# Patient Record
Sex: FEMALE | URBAN - METROPOLITAN AREA
[De-identification: names, ages, dates, MRNs, and addresses within clinical notes are randomized per-mention and may not be internally consistent; named-entity substitution may affect disease eponyms.]

---

## 2023-02-21 ENCOUNTER — HOSPITAL ENCOUNTER (OUTPATIENT)
Age: 28
Setting detail: SPECIMEN
Discharge: HOME | End: 2023-02-21

## 2023-02-21 ENCOUNTER — OFFICE VISIT (OUTPATIENT)
Dept: OBGYN CLINIC | Age: 28
End: 2023-02-21
Payer: COMMERCIAL

## 2023-02-21 VITALS
SYSTOLIC BLOOD PRESSURE: 124 MMHG | DIASTOLIC BLOOD PRESSURE: 72 MMHG | HEIGHT: 70 IN | BODY MASS INDEX: 19.53 KG/M2 | WEIGHT: 136.4 LBS

## 2023-02-21 DIAGNOSIS — N92.6 MISSED MENSES: ICD-10-CM

## 2023-02-21 DIAGNOSIS — Z32.01 POSITIVE PREGNANCY TEST: Primary | ICD-10-CM

## 2023-02-21 DIAGNOSIS — Z32.01 POSITIVE PREGNANCY TEST: ICD-10-CM

## 2023-02-21 LAB
BILIRUBIN URINE: NEGATIVE
COLOR: YELLOW
GLUCOSE UR STRIP.AUTO-MCNC: NEGATIVE MG/DL
KETONES UR STRIP.AUTO-MCNC: NEGATIVE MG/DL
LEUKOCYTE ESTERASE UR QL STRIP.AUTO: ABNORMAL
NITRITE UR QL STRIP.AUTO: NEGATIVE
PROT UR STRIP.AUTO-MCNC: 6 MG/DL (ref 5–8)
PROT UR STRIP.AUTO-MCNC: NEGATIVE MG/DL
SPECIFIC GRAVITY UA: 1.03 (ref 1–1.03)
TURBIDITY: CLEAR
URINE HGB: NEGATIVE
UROBILINOGEN, URINE: NORMAL

## 2023-02-21 PROCEDURE — 99214 OFFICE O/P EST MOD 30 MIN: CPT

## 2023-02-21 ASSESSMENT — ENCOUNTER SYMPTOMS
CONSTIPATION: 0
NAUSEA: 0
VOMITING: 0
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
## 2023-02-22 LAB
AMPHETAMINE SCREEN URINE: NEGATIVE
BACTERIA: ABNORMAL
BARBITURATE SCREEN URINE: NEGATIVE
BENZODIAZEPINE SCREEN, URINE: NEGATIVE
C TRACH DNA SPEC QL PROBE+SIG AMP: NEGATIVE
CANDIDA SPECIES, DNA PROBE: NEGATIVE
CANNABINOID SCREEN URINE: NEGATIVE
COCAINE METABOLITE, URINE: NEGATIVE
EPITHELIAL CELLS UA: ABNORMAL /HPF (ref 0–5)
FENTANYL URINE: NEGATIVE
GARDNERELLA VAGINALIS, DNA PROBE: POSITIVE
METHADONE SCREEN, URINE: NEGATIVE
MICROORGANISM SPEC CULT: NORMAL
MUCUS: ABNORMAL
N GONORRHOEA DNA SPEC QL PROBE+SIG AMP: NEGATIVE
OPIATES, URINE: NEGATIVE
OXYCODONE SCREEN URINE: NEGATIVE
PHENCYCLIDINE, URINE: NEGATIVE
RBC CLUMPS #/AREA URNS AUTO: ABNORMAL /HPF (ref 0–2)
SOURCE: ABNORMAL
SPECIMEN DESCRIPTION: NORMAL
SPECIMEN DESCRIPTION: NORMAL
TEST INFORMATION: NORMAL
TRICHOMONAS VAGINALIS DNA: NEGATIVE
WBC UA: ABNORMAL /HPF (ref 0–5)

## 2023-02-23 RX ORDER — METRONIDAZOLE 7.5 MG/G
1 GEL VAGINAL NIGHTLY
Qty: 1 EACH | Refills: 0 | Status: SHIPPED | OUTPATIENT
Start: 2023-02-23 | End: 2023-03-02

## 2023-03-03 ENCOUNTER — HOSPITAL ENCOUNTER (OUTPATIENT)
Age: 28
Setting detail: SPECIMEN
Discharge: HOME | End: 2023-03-03

## 2023-03-03 DIAGNOSIS — Z32.01 POSITIVE PREGNANCY TEST: ICD-10-CM

## 2023-03-03 DIAGNOSIS — N92.6 MISSED MENSES: ICD-10-CM

## 2023-03-03 LAB
ABSOLUTE EOS #: 0.24 K/UL (ref 0–0.44)
ABSOLUTE IMMATURE GRANULOCYTE: <0.03 K/UL (ref 0–0.3)
ABSOLUTE LYMPH #: 1.72 K/UL (ref 1.1–3.7)
ABSOLUTE MONO #: 0.43 K/UL (ref 0.1–1.2)
BASOPHILS # BLD: 1 % (ref 0–2)
BASOPHILS ABSOLUTE: 0.04 K/UL (ref 0–0.2)
EOSINOPHILS RELATIVE PERCENT: 3 % (ref 1–4)
HCT VFR BLD AUTO: 40 % (ref 36.3–47.1)
HGB BLD-MCNC: 13.5 G/DL (ref 11.9–15.1)
IMMATURE GRANULOCYTES: 0 %
LYMPHOCYTES # BLD: 22 % (ref 24–43)
MCH RBC QN AUTO: 29.3 PG (ref 25.2–33.5)
MCHC RBC AUTO-ENTMCNC: 33.8 G/DL (ref 28.4–34.8)
MCV RBC AUTO: 87 FL (ref 82.6–102.9)
MONOCYTES # BLD: 5 % (ref 3–12)
NRBC AUTOMATED: 0 PER 100 WBC
PDW BLD-RTO: 12.7 % (ref 11.8–14.4)
PLATELET # BLD AUTO: 206 K/UL (ref 138–453)
PMV BLD AUTO: 9.7 FL (ref 8.1–13.5)
RBC # BLD: 4.6 M/UL (ref 3.95–5.11)
SEG NEUTROPHILS: 69 % (ref 36–65)
SEGMENTED NEUTROPHILS ABSOLUTE COUNT: 5.54 K/UL (ref 1.5–8.1)
WBC # BLD AUTO: 8 K/UL (ref 3.5–11.3)

## 2023-03-04 LAB
ABO/RH: NORMAL
ANTIBODY SCREEN: NEGATIVE
HBV SURFACE AG SER QL: NONREACTIVE
HCV AB SER QL: NONREACTIVE
HISTORY CHECK: NORMAL
HIV 1+2 AB+HIV1 P24 AG SERPL QL IA: NONREACTIVE
RUBV IGG SER QL: 36.3 IU/ML
T PALLIDUM AB SER QL IA: NONREACTIVE

## 2023-03-06 ENCOUNTER — INITIAL PRENATAL (OUTPATIENT)
Dept: OBGYN CLINIC | Age: 28
End: 2023-03-06

## 2023-03-06 VITALS
WEIGHT: 136.6 LBS | BODY MASS INDEX: 20.23 KG/M2 | HEIGHT: 69 IN | DIASTOLIC BLOOD PRESSURE: 66 MMHG | SYSTOLIC BLOOD PRESSURE: 102 MMHG

## 2023-03-06 DIAGNOSIS — Z34.01 FIRST PREGNANCY, FIRST TRIMESTER: ICD-10-CM

## 2023-03-06 DIAGNOSIS — Z3A.11 11 WEEKS GESTATION OF PREGNANCY: Primary | ICD-10-CM

## 2023-03-06 DIAGNOSIS — Z83.3 FH: TYPE 1 DIABETES: ICD-10-CM

## 2023-03-06 DIAGNOSIS — G43.011 INTRACTABLE MIGRAINE WITHOUT AURA AND WITH STATUS MIGRAINOSUS: ICD-10-CM

## 2023-03-06 PROBLEM — O26.899 RH NEGATIVE STATE IN ANTEPARTUM PERIOD: Status: ACTIVE | Noted: 2023-03-06

## 2023-03-06 PROBLEM — Z67.91 RH NEGATIVE STATE IN ANTEPARTUM PERIOD: Status: ACTIVE | Noted: 2023-03-06

## 2023-03-06 PROCEDURE — 0500F INITIAL PRENATAL CARE VISIT: CPT | Performed by: OBSTETRICS & GYNECOLOGY

## 2023-03-06 RX ORDER — CALCIUM CARBONATE/VITAMIN D3 500-10/5ML
400 LIQUID (ML) ORAL NIGHTLY
Qty: 30 CAPSULE | Refills: 12 | Status: SHIPPED | OUTPATIENT
Start: 2023-03-06 | End: 2023-04-05

## 2023-03-06 NOTE — PROGRESS NOTES
Relationship with FOB: , living together, first pregnancy together  Partner's name:Mika   Plans to Breast fdg  Pain Score:0/10  Job title:-teachers aid  This is a planned pregnancy:Yes, not preventing  Certain LMP:Yes  S/S of pregnancy:Yes, breast tenderness and missed cycle. Hx N/V pregnancy:Mild nausea       Mother's ethnicity:    Father's ethnicity:       -  There is no problem list on file for this patient. Blood pressure 102/66, height 5' 9\" (1.753 m), weight 136 lb 9.6 oz (62 kg), last menstrual period 2022. Shyam Santiago is a 32 y.o. , here for her ACOG. The patients past medical, surgical, social and family history were reviewed. Current medications and allergies were reviewed, and documented in the chart. Menstrual history: Regular  Birth control: BCP and depo. Depo was a bad experience mentally. Desires IUD after delivery. Wt Readings from Last 3 Encounters:   23 136 lb 9.6 oz (62 kg)   23 136 lb 6.4 oz (61.9 kg)     Recent Results (from the past 8736 hour(s))   Urinalysis    Collection Time: 23 11:11 PM   Result Value Ref Range    Color, UA Yellow Yellow    Turbidity UA Clear Clear    Glucose, Ur NEGATIVE NEGATIVE    Bilirubin Urine NEGATIVE NEGATIVE    Ketones, Urine NEGATIVE NEGATIVE    Specific Gravity, UA 1.026 1.005 - 1.030    Urine Hgb NEGATIVE NEGATIVE    pH, UA 6.0 5.0 - 8.0    Protein, UA NEGATIVE NEGATIVE    Urobilinogen, Urine Normal Normal    Nitrite, Urine NEGATIVE NEGATIVE    Leukocyte Esterase, Urine TRACE (A) NEGATIVE   Culture, Urine    Collection Time: 23 11:11 PM    Specimen: Urine, clean catch   Result Value Ref Range    Specimen Description . CLEAN CATCH URINE     Culture NO SIGNIFICANT GROWTH    Urine Drug Screen    Collection Time: 23 11:11 PM   Result Value Ref Range    Amphetamine Screen, Ur NEGATIVE NEGATIVE    Barbiturate Screen, Ur NEGATIVE NEGATIVE    Benzodiazepine Screen, Urine NEGATIVE NEGATIVE    Cocaine Metabolite, Urine NEGATIVE NEGATIVE    Methadone Screen, Urine NEGATIVE NEGATIVE    Opiates, Urine NEGATIVE NEGATIVE    Phencyclidine, Urine NEGATIVE NEGATIVE    Cannabinoid Scrn, Ur NEGATIVE NEGATIVE    Oxycodone Screen, Ur NEGATIVE NEGATIVE    Fentanyl, Ur NEGATIVE NEGATIVE    Test Information       Assay provides medical screening only. The absence of expected drug(s) and/or metabolite(s) may indicate diluted or adulterated urine, limitations of testing or timing of collection. Microscopic Urinalysis    Collection Time: 02/21/23 11:11 PM   Result Value Ref Range    WBC, UA 5 TO 10 0 - 5 /HPF    RBC, UA 2 TO 5 0 - 2 /HPF    Epithelial Cells UA 5 TO 10 0 - 5 /HPF    Bacteria, UA FEW (A) None    Mucus, UA 1+ (A) None   C.trachomatis N.gonorrhoeae DNA    Collection Time: 02/21/23 11:12 PM    Specimen: Cervix   Result Value Ref Range    Specimen Description . CERVIX     C. trachomatis DNA NEGATIVE NEGATIVE    N. gonorrhoeae DNA NEGATIVE NEGATIVE   Vaginitis DNA Probe    Collection Time: 02/21/23 11:12 PM    Specimen: Vaginal   Result Value Ref Range    Source . VAGINAL SWAB     Trichomonas Vaginalis DNA NEGATIVE NEGATIVE    Gardnerella Vaginalis, DNA Probe POSITIVE (A) NEGATIVE    Candida Species, DNA Probe NEGATIVE NEGATIVE   TYPE AND SCREEN    Collection Time: 03/03/23  3:50 PM   Result Value Ref Range    ABO/Rh A NEGATIVE     Antibody Screen NEGATIVE     History Check NO PREVIOUS HISTORY    T. pallidum Ab    Collection Time: 03/03/23  3:50 PM   Result Value Ref Range    T. pallidum, IgG NONREACTIVE NONREACTIVE   Rubella antibody, IgG    Collection Time: 03/03/23  3:50 PM   Result Value Ref Range    Rubella Antibody, IgG 36.3 IU/mL   HIV Screen    Collection Time: 03/03/23  3:50 PM   Result Value Ref Range    HIV Ag/Ab NONREACTIVE NONREACTIVE   Hepatitis C Antibody    Collection Time: 03/03/23  3:50 PM   Result Value Ref Range    Hepatitis C Ab NONREACTIVE NONREACTIVE   Hepatitis B Surface Antigen Obstetric Panel    Collection Time: 03/03/23  3:50 PM   Result Value Ref Range    Hepatitis B Surface Ag NONREACTIVE NONREACTIVE   CBC with Auto Differential    Collection Time: 03/03/23  3:50 PM   Result Value Ref Range    WBC 8.0 3.5 - 11.3 k/uL    RBC 4.60 3.95 - 5.11 m/uL    Hemoglobin 13.5 11.9 - 15.1 g/dL    Hematocrit 40.0 36.3 - 47.1 %    MCV 87.0 82.6 - 102.9 fL    MCH 29.3 25.2 - 33.5 pg    MCHC 33.8 28.4 - 34.8 g/dL    RDW 12.7 11.8 - 14.4 %    Platelets 815 130 - 921 k/uL    MPV 9.7 8.1 - 13.5 fL    NRBC Automated 0.0 0.0 per 100 WBC    Seg Neutrophils 69 (H) 36 - 65 %    Lymphocytes 22 (L) 24 - 43 %    Monocytes 5 3 - 12 %    Eosinophils % 3 1 - 4 %    Basophils 1 0 - 2 %    Immature Granulocytes 0 0 %    Segs Absolute 5.54 1.50 - 8.10 k/uL    Absolute Lymph # 1.72 1.10 - 3.70 k/uL    Absolute Mono # 0.43 0.10 - 1.20 k/uL    Absolute Eos # 0.24 0.00 - 0.44 k/uL    Basophils Absolute 0.04 0.00 - 0.20 k/uL    Absolute Immature Granulocyte <0.03 0.00 - 0.30 k/uL       Past Medical History:   Diagnosis Date    Complication of anesthesia     Menopausal symptoms     Migraine 2005    less freq as adult: as adult qother month    Neurologic disorder                                                                    Past Surgical History:   Procedure Laterality Date    WISDOM TOOTH EXTRACTION       Family History   Problem Relation Age of Onset    No Known Problems Mother     No Known Problems Father     No Known Problems Sister     No Known Problems Brother     Diabetes type 2  Maternal Grandmother     Heart Disease Maternal Grandfather     Heart Surgery Maternal Grandfather     No Known Problems Paternal Grandmother     Cancer Paternal Grandfather         throat/smoker    Diabetes Type 1  Paternal Cousin 5        Type I     Social History     Tobacco Use   Smoking Status Never   Smokeless Tobacco Never     Social History     Substance and Sexual Activity   Alcohol Use Not Currently MEDICATIONS:  Current Outpatient Medications   Medication Sig Dispense Refill    Magnesium Oxide 400 MG CAPS Take 400 mg by mouth at bedtime 30 capsule 12    Prenatal Vit-Fe Sulfate-FA-DHA (PRENATAL VITAMIN/MIN +DHA PO) Take by mouth       No current facility-administered medications for this visit. ALLERGIES:  Duricef [cefadroxil]    Reviewed global and practice OB care including nausea measures, nutrition, activities, warning signs, and contact information. Offered cell free DNA screen,NT echo and WIC .    `--------------------------------------------------------------------------  Genetic Screening/Teratology Counseling  (Include patient, FOB or anyone in either family)    1) Patient's age 28 years or > at WILLIAM: No  2) Thalassemia (Mediterranean, ): No  3) Neural Tube Defect:   No  4) Congenital heart defect:   No  5) Trisomy (e.g. Down Syndrome):  No  6) Croy-sachs (Islam, [address]): No  7) Multiple Births:    No  8) Sickle cell (disease or trait):  No  9) Hemophilia or blood disorders:  No  10) Muscular Dystrophy:   No  11) Cystic Fibrosis:    No  12) Holbrook's chorea:   No  13) Mental retardation/Autism :  No   If yes, was person tested for fragile X: No  14) Other inherited genetic/chromosomal disorder: Yes Pt's P.Cousin Dx type-1 age 11.   13) Maternal metabolic disorder (DM, PKU): No  12) Child with birth defect not listed:  No  17) Recurrent pregnancy loss/stillbirth: No  18) Medications, supplements/illicit or   Recreational drugs/alcohol since LMP: No   List: none  19) Any other:   none    Comments/Counseling: Pt presents with /Mika for their ACOG/OB educational visit with nurse. -POC send referral to [facility] for 20wk anatomy scan  -POC they are undecided if they want to do NIPT and plan to check on cost.   -POC start on nightly dose of Magnesium Oxide 400 mg.  Pt c/o Migraines once/month with aura, finger tips have gone numb, tongue has gone number, unable to tolerate light and is not able to talk. Pt states, \"when I have a migraine I have to really think how to talk to be able to talk. \" Hx used Excedrin Migraine with good results but has to sleep when migraine comes. -POC set up OB visits for duration pregnancy, mail calendar and letter that she may change appts prn.   -------------------------------------------------------------------------  Infection History:    1) Live with someone with TB/exposed to TB: No  2) Patient/partner has h/o genital herpes: No  3) Rash/viral illness since LMP:  No  4) History of STD:    No  5) Other: No  -------------------------------------------------------------------------     Check list reviewed with New OB patient:    [facility]  -Delivery only at [facility]  -Several providers in practice and only doctors do deliveries  -May reach practice via [address] or phone. [employer] messages are only answered M-Fri when office is open. Testing  -Genetic testing (NIPT, AFP and/or referral to [facility])  -Testing per ACOG guidelines that are needed for any pregnancy  -Testing may be added according to your needs or if you become high risk  -Normal pregnancy US, one dating and one 20 week anatomy scan  -referral to [facility] each patient 20 week Ultrasound only    Appts:  -q 4 wks until your 28 wks  -@ 28wk q2wks  -@ 36wks q week  -this changes if you have increased risk factors    Diet:  -Nothing unpasteurized  -Lunch meats need to be steamed or heated  -Meats need to be thoroughly cooked, nothing pink or bldg  -Caffeine MAX per ACOG 16 oz/per day  -Artifical sweetener, limit no confirmed abnormal findings with development of fetus   -Fish, detailed list provided in OB packet, avoid large fish and only so many oz per week  -If you are vegan or vegetarian. OB pt needs 60 gm protein per day. -Caution with dehydration may cause cramping.      Exercise,Traveling and safety  -No sit ups after 14 weeks  -HR needs to be <160  -No heavy squatting  -nothing where you can fall and hurt yourself  (your center of gravity is not same when pregnant)  -Ask your provider if it's safe for you to fly  -long travel need to get up and walk around after 2 hours  -wear seat belt below gravid abd  -good support socks while traveling to aid with circulation    Advise  -Review need for vaccines in pregnancy COVID, FLU and T-dap,   -No sauna's or hot tubs   -Bug spray, nothing with Deet in it  -Seeing dentist once per pregnancy, any infection can cause  labor, will need note for dentist

## 2023-05-08 ENCOUNTER — ROUTINE PRENATAL (OUTPATIENT)
Dept: OBGYN CLINIC | Age: 28
End: 2023-05-08

## 2023-05-08 VITALS
WEIGHT: 150 LBS | SYSTOLIC BLOOD PRESSURE: 106 MMHG | HEART RATE: 71 BPM | DIASTOLIC BLOOD PRESSURE: 70 MMHG | BODY MASS INDEX: 22.15 KG/M2

## 2023-05-08 DIAGNOSIS — Z34.92 ENCOUNTER FOR SUPERVISION OF LOW-RISK PREGNANCY IN SECOND TRIMESTER: Primary | ICD-10-CM

## 2023-05-08 DIAGNOSIS — Z3A.20 20 WEEKS GESTATION OF PREGNANCY: ICD-10-CM

## 2023-05-08 PROCEDURE — 0502F SUBSEQUENT PRENATAL CARE: CPT | Performed by: STUDENT IN AN ORGANIZED HEALTH CARE EDUCATION/TRAINING PROGRAM

## 2023-05-08 NOTE — PROGRESS NOTES
Prenatal Visit    Sav Reynaga is a 32 y.o. female  at [address] [facility]    The patient was seen and evaluated. She has no complaints today. Reports feeling some small fetal movements. She denies headache, vision changes, RUQ pain, contractions, vaginal bleeding and leakage of fluid. Is tolerating her prenatal vitamins. Having no issues with urination or bowel movements. She is requesting information about parenting classes. The problem list reflects the active issues addressed during today's visit    Vitals:    BP: 106/70  Weight - Scale: 150 lb (68 kg)  Pulse: 71  Patient Position: Sitting  Fetal HR: 151  Movement: Increased     PHYSICAL:   General appearance: no apparent distress, alert and cooperative  HEENT: head atraumatic, normocephalic, trachea midline, moist mucous membranes   Neurologic: alert, oriented, normal speech   Lungs: no increased work of breathing,   Abdomen: soft, gravid, non-tender on palpation    Musculoskeletal: no gross abnormalities, range of motion appropriate for age   Psychiatric: mood appropriate, normal affect      Assessment & Plan: Sav Reynaga is a 32 y.o. female  at 22w2d IUP   - VSS     - Rh negative, rhogam not due this appointment    - Anatomy scan scheduled 5/15/23    - Declining NIPT    - Continue taking prenatal vitamins QD    - COVID-19 vaccination: R/B/A discussed with increased risk of both maternal and fetal morbidity and mortality in unvaccinated pregnant patients who contract COVID-19- patient is undecided today   Return in about 4 weeks (around 2023) for [address].           Patient Active Problem List    Diagnosis Date Noted    Rh negative state in antepartum period 2023     Priority: [facility]   2023, 10:14 AM

## 2023-05-15 ENCOUNTER — ROUTINE PRENATAL (OUTPATIENT)
Dept: PERINATAL CARE | Age: 28
End: 2023-05-15
Payer: COMMERCIAL

## 2023-05-15 VITALS
RESPIRATION RATE: 16 BRPM | HEIGHT: 69 IN | WEIGHT: 153 LBS | SYSTOLIC BLOOD PRESSURE: 104 MMHG | BODY MASS INDEX: 22.66 KG/M2 | HEART RATE: 81 BPM | DIASTOLIC BLOOD PRESSURE: 70 MMHG | TEMPERATURE: 99.1 F

## 2023-05-15 DIAGNOSIS — Z3A.21 21 WEEKS GESTATION OF PREGNANCY: ICD-10-CM

## 2023-05-15 DIAGNOSIS — Z87.59 HISTORY OF MIGRAINE DURING PREGNANCY: ICD-10-CM

## 2023-05-15 DIAGNOSIS — Z86.69 HISTORY OF MIGRAINE DURING PREGNANCY: ICD-10-CM

## 2023-05-15 DIAGNOSIS — Z36.86 ENCOUNTER FOR SCREENING FOR RISK OF PRE-TERM LABOR: ICD-10-CM

## 2023-05-15 DIAGNOSIS — O43.102 PLACENTAL ABNORMALITY IN SECOND TRIMESTER: Primary | ICD-10-CM

## 2023-05-15 LAB
ABDOMINAL CIRCUMFERENCE: NORMAL
ABDOMINAL CIRCUMFERENCE: NORMAL
BIPARIETAL DIAMETER: NORMAL
BIPARIETAL DIAMETER: NORMAL
ESTIMATED FETAL WEIGHT: NORMAL
ESTIMATED FETAL WEIGHT: NORMAL
FEMORAL DIAMETER: NORMAL
FEMORAL DIAMETER: NORMAL
HC/AC: NORMAL
HC/AC: NORMAL
HEAD CIRCUMFERENCE: NORMAL
HEAD CIRCUMFERENCE: NORMAL

## 2023-05-15 PROCEDURE — 76811 OB US DETAILED SNGL FETUS: CPT | Performed by: OBSTETRICS & GYNECOLOGY

## 2023-05-15 PROCEDURE — 76817 TRANSVAGINAL US OBSTETRIC: CPT | Performed by: OBSTETRICS & GYNECOLOGY

## 2023-05-15 PROCEDURE — 99203 OFFICE O/P NEW LOW 30 MIN: CPT | Performed by: OBSTETRICS & GYNECOLOGY

## 2023-07-06 ENCOUNTER — HOSPITAL ENCOUNTER (OUTPATIENT)
Age: 28
Setting detail: SPECIMEN
Discharge: HOME | End: 2023-07-06

## 2023-07-06 ENCOUNTER — ROUTINE PRENATAL (OUTPATIENT)
Dept: OBGYN CLINIC | Age: 28
End: 2023-07-06

## 2023-07-06 VITALS — DIASTOLIC BLOOD PRESSURE: 62 MMHG | WEIGHT: 161 LBS | SYSTOLIC BLOOD PRESSURE: 124 MMHG | BODY MASS INDEX: 23.78 KG/M2

## 2023-07-06 DIAGNOSIS — Z23 NEED FOR DIPHTHERIA-TETANUS-PERTUSSIS (TDAP) VACCINE: ICD-10-CM

## 2023-07-06 DIAGNOSIS — Z3A.25 25 WEEKS GESTATION OF PREGNANCY: ICD-10-CM

## 2023-07-06 DIAGNOSIS — O46.90 VAGINAL BLEEDING DURING PREGNANCY: ICD-10-CM

## 2023-07-06 DIAGNOSIS — Z67.91 RH NEGATIVE STATE IN ANTEPARTUM PERIOD: ICD-10-CM

## 2023-07-06 DIAGNOSIS — O23.593 VAGINITIS DURING PREGNANCY IN THIRD TRIMESTER: ICD-10-CM

## 2023-07-06 DIAGNOSIS — N76.0 VAGINITIS DURING PREGNANCY IN THIRD TRIMESTER: ICD-10-CM

## 2023-07-06 DIAGNOSIS — Z3A.29 29 WEEKS GESTATION OF PREGNANCY: Primary | ICD-10-CM

## 2023-07-06 DIAGNOSIS — O26.899 RH NEGATIVE STATE IN ANTEPARTUM PERIOD: ICD-10-CM

## 2023-07-06 LAB
BASOPHILS # BLD: 0.05 K/UL (ref 0–0.2)
BASOPHILS NFR BLD: 1 % (ref 0–2)
CANDIDA SPECIES, DNA PROBE: NEGATIVE
EOSINOPHIL # BLD: 0.14 K/UL (ref 0–0.44)
EOSINOPHILS RELATIVE PERCENT: 1 % (ref 1–4)
ERYTHROCYTE [DISTWIDTH] IN BLOOD BY AUTOMATED COUNT: 13.4 % (ref 11.8–14.4)
GARDNERELLA VAGINALIS, DNA PROBE: POSITIVE
GLUCOSE 1H P 50 G GLC PO SERPL-MCNC: 109 MG/DL (ref 70–135)
GLUCOSE ADMINISTRATION: NORMAL
HCT VFR BLD AUTO: 36.4 % (ref 36.3–47.1)
HGB BLD-MCNC: 12.2 G/DL (ref 11.9–15.1)
IMM GRANULOCYTES # BLD AUTO: 0.04 K/UL (ref 0–0.3)
IMM GRANULOCYTES NFR BLD: 0 %
LYMPHOCYTES # BLD: 13 % (ref 24–43)
LYMPHOCYTES NFR BLD: 1.41 K/UL (ref 1.1–3.7)
MCH RBC QN AUTO: 31 PG (ref 25.2–33.5)
MCHC RBC AUTO-ENTMCNC: 33.5 G/DL (ref 28.4–34.8)
MCV RBC AUTO: 92.4 FL (ref 82.6–102.9)
MONOCYTES NFR BLD: 0.48 K/UL (ref 0.1–1.2)
MONOCYTES NFR BLD: 4 % (ref 3–12)
NEUTROPHILS NFR BLD: 81 % (ref 36–65)
NEUTS SEG NFR BLD: 8.84 K/UL (ref 1.5–8.1)
NRBC BLD-RTO: 0 PER 100 WBC
PLATELET # BLD AUTO: 171 K/UL (ref 138–453)
PMV BLD AUTO: 9.8 FL (ref 8.1–13.5)
RBC # BLD AUTO: 3.94 M/UL (ref 3.95–5.11)
SOURCE: ABNORMAL
TRICHOMONAS VAGINALIS DNA: NEGATIVE
WBC OTHER # BLD: 11 K/UL (ref 3.5–11.3)

## 2023-07-06 RX ORDER — BREAST PUMP
1 EACH MISCELLANEOUS
Qty: 1 EACH | Refills: 0 | Status: SHIPPED | OUTPATIENT
Start: 2023-07-06

## 2023-07-06 ASSESSMENT — ENCOUNTER SYMPTOMS
CONSTIPATION: 0
NAUSEA: 0
VOMITING: 0

## 2023-07-07 RX ORDER — METRONIDAZOLE 7.5 MG/G
1 GEL VAGINAL NIGHTLY
Qty: 1 EACH | Refills: 0 | Status: SHIPPED | OUTPATIENT
Start: 2023-07-07 | End: 2023-07-14

## 2023-07-16 PROBLEM — Z34.03 ENCOUNTER FOR SUPERVISION OF NORMAL FIRST PREGNANCY IN THIRD TRIMESTER: Status: ACTIVE | Noted: 2023-07-16

## 2023-07-17 ENCOUNTER — ROUTINE PRENATAL (OUTPATIENT)
Dept: OBGYN CLINIC | Age: 28
End: 2023-07-17

## 2023-07-17 VITALS
BODY MASS INDEX: 23.92 KG/M2 | HEART RATE: 69 BPM | SYSTOLIC BLOOD PRESSURE: 106 MMHG | DIASTOLIC BLOOD PRESSURE: 71 MMHG | WEIGHT: 162 LBS

## 2023-07-17 DIAGNOSIS — Z34.03 ENCOUNTER FOR SUPERVISION OF NORMAL FIRST PREGNANCY IN THIRD TRIMESTER: ICD-10-CM

## 2023-07-17 DIAGNOSIS — Z3A.30 30 WEEKS GESTATION OF PREGNANCY: Primary | ICD-10-CM

## 2023-07-17 PROCEDURE — 0502F SUBSEQUENT PRENATAL CARE: CPT | Performed by: OBSTETRICS & GYNECOLOGY

## 2023-07-30 NOTE — PROGRESS NOTES
Prenatal Visit    Richie Morris is a 32 y.o. female  at [address]    The patient was seen and evaluated. She has no complaints today. Tolerating prenatals still. Has no urinary or bowel complaints. Still having vaginal discharge that she wants retested to make sure her most recent BV infection has cleared. Denies any pelvic pain or vulvar irritation. She reports positive fetal movements. She denies contractions, vaginal bleeding and leakage of fluid. Signs and symptoms of labor and pre-eclampsia were reviewed with the patient in detail. She is to report any of these if they occur. She currently denies any of these. The problem list reflects the active issues addressed during today's visit    Vitals:     BP: 119/79  Weight - Scale: 162 lb (73.5 kg)  Pulse: 70  Patient Position: Sitting  Fundal Height (cm): 32 cm  Fetal HR: 145  Movement: Present     PHYSICAL:   General appearance: no apparent distress, alert and cooperative  HEENT: head atraumatic, normocephalic, trachea midline, moist mucous membranes   Neurologic: alert, oriented, normal speech   Lungs: no increased work of breathing,   Abdomen: soft, gravid, non-tender on palpation    Musculoskeletal: no gross abnormalities, range of motion appropriate for age   Psychiatric: mood appropriate, normal affect      Assessment & Plan:   Richie Morris is a 32 y.o. female  at 26w3d   - VSS     - 28 week labs completed   - Follow up MFM ultrasound scheduled for today    - Continue taking prenatal vitamins QD    - Tdap vaccination: already received     - Influenza vaccination: due this upcoming season     - Rhogam: given 23   - COVID-19 vaccination: R/B/A discussed with increased risk of both maternal and fetal morbidity and mortality in unvaccinated pregnant patients who contract COVID-19- patient is undecided today   - Vaginitis swab collected, will treat if anything is pos as patient is complaining of increased discharge    - Information given about

## 2023-07-31 ENCOUNTER — ROUTINE PRENATAL (OUTPATIENT)
Dept: OBGYN CLINIC | Age: 28
End: 2023-07-31

## 2023-07-31 ENCOUNTER — ROUTINE PRENATAL (OUTPATIENT)
Dept: PERINATAL CARE | Age: 28
End: 2023-07-31
Payer: COMMERCIAL

## 2023-07-31 ENCOUNTER — HOSPITAL ENCOUNTER (OUTPATIENT)
Age: 28
Setting detail: SPECIMEN
Discharge: HOME | End: 2023-07-31

## 2023-07-31 VITALS
DIASTOLIC BLOOD PRESSURE: 79 MMHG | SYSTOLIC BLOOD PRESSURE: 119 MMHG | BODY MASS INDEX: 23.92 KG/M2 | HEART RATE: 70 BPM | WEIGHT: 162 LBS

## 2023-07-31 VITALS
HEIGHT: 69 IN | RESPIRATION RATE: 16 BRPM | WEIGHT: 163 LBS | SYSTOLIC BLOOD PRESSURE: 100 MMHG | TEMPERATURE: 98.4 F | DIASTOLIC BLOOD PRESSURE: 62 MMHG | HEART RATE: 68 BPM | BODY MASS INDEX: 24.14 KG/M2

## 2023-07-31 DIAGNOSIS — Z86.69 HISTORY OF MIGRAINE DURING PREGNANCY: ICD-10-CM

## 2023-07-31 DIAGNOSIS — Z3A.32 32 WEEKS GESTATION OF PREGNANCY: ICD-10-CM

## 2023-07-31 DIAGNOSIS — Z87.59 HISTORY OF MIGRAINE DURING PREGNANCY: ICD-10-CM

## 2023-07-31 DIAGNOSIS — Z34.03 ENCOUNTER FOR SUPERVISION OF NORMAL FIRST PREGNANCY IN THIRD TRIMESTER: Primary | ICD-10-CM

## 2023-07-31 DIAGNOSIS — Z36.4 ULTRASOUND FOR ANTENATAL SCREENING FOR FETAL GROWTH RESTRICTION: ICD-10-CM

## 2023-07-31 DIAGNOSIS — Z13.89 ENCOUNTER FOR ROUTINE SCREENING FOR MALFORMATION USING ULTRASONICS: ICD-10-CM

## 2023-07-31 DIAGNOSIS — O43.103 PLACENTAL ABNORMALITY IN THIRD TRIMESTER: Primary | ICD-10-CM

## 2023-07-31 DIAGNOSIS — N89.8 VAGINAL DISCHARGE: ICD-10-CM

## 2023-07-31 LAB
ABDOMINAL CIRCUMFERENCE: NORMAL
BIPARIETAL DIAMETER: NORMAL
ESTIMATED FETAL WEIGHT: NORMAL
FEMORAL DIAMETER: NORMAL
HC/AC: NORMAL
HEAD CIRCUMFERENCE: NORMAL

## 2023-07-31 PROCEDURE — 0502F SUBSEQUENT PRENATAL CARE: CPT | Performed by: STUDENT IN AN ORGANIZED HEALTH CARE EDUCATION/TRAINING PROGRAM

## 2023-07-31 PROCEDURE — 76819 FETAL BIOPHYS PROFIL W/O NST: CPT | Performed by: OBSTETRICS & GYNECOLOGY

## 2023-07-31 PROCEDURE — 99999 PR OFFICE/OUTPT VISIT,PROCEDURE ONLY: CPT | Performed by: OBSTETRICS & GYNECOLOGY

## 2023-07-31 PROCEDURE — 76805 OB US >/= 14 WKS SNGL FETUS: CPT | Performed by: OBSTETRICS & GYNECOLOGY

## 2023-08-01 LAB
CANDIDA SPECIES: NEGATIVE
GARDNERELLA VAGINALIS: NEGATIVE
SOURCE: NORMAL
TRICHOMONAS: NEGATIVE

## 2023-08-14 ENCOUNTER — ROUTINE PRENATAL (OUTPATIENT)
Dept: OBGYN CLINIC | Age: 28
End: 2023-08-14

## 2023-08-14 VITALS
HEART RATE: 67 BPM | DIASTOLIC BLOOD PRESSURE: 64 MMHG | BODY MASS INDEX: 24.37 KG/M2 | WEIGHT: 165 LBS | SYSTOLIC BLOOD PRESSURE: 104 MMHG

## 2023-08-14 DIAGNOSIS — Z67.91 RH NEGATIVE STATE IN ANTEPARTUM PERIOD: Primary | ICD-10-CM

## 2023-08-14 DIAGNOSIS — O26.899 RH NEGATIVE STATE IN ANTEPARTUM PERIOD: Primary | ICD-10-CM

## 2023-08-14 DIAGNOSIS — Z34.03 ENCOUNTER FOR SUPERVISION OF NORMAL FIRST PREGNANCY IN THIRD TRIMESTER: ICD-10-CM

## 2023-08-14 DIAGNOSIS — Z3A.34 34 WEEKS GESTATION OF PREGNANCY: ICD-10-CM

## 2023-08-14 PROCEDURE — 0502F SUBSEQUENT PRENATAL CARE: CPT | Performed by: STUDENT IN AN ORGANIZED HEALTH CARE EDUCATION/TRAINING PROGRAM

## 2023-08-14 NOTE — PROGRESS NOTES
Prenatal Visit    Onelia Jaeger is a 32 y.o. female  at 27w3d    The patient was seen and evaluated. Reports positive fetal movements. She denies headache, vision changes, RUQ pain, contractions, vaginal bleeding and leakage of fluid. The patient already received the T-Dap Vaccine (27-36 weeks) this pregnancy. Flu shot due this fall. The problem list reflects the active issues addressed during today's visit    Vitals:    BP: 104/64  Weight - Scale: 165 lb (74.8 kg)  Pulse: 67  Patient Position: Sitting  Fundal Height (cm): 34 cm  Fetal HR: 145  Movement: Present     28 Week Labs: The patient is RH neg, Rhogam 23  ABO/Rh   Date Value Ref Range Status   2023 A NEGATIVE  Final       1hr GTT: 109   28 week CBC:   Lab Results   Component Value Date    WBC 11.0 2023    HGB 12.2 2023    HCT 36.4 2023    MCV 92.4 2023     2023     UA w/ Ur C&S: neg     Assessment & Plan: Onelia Jaeger is a 32 y.o. female  at 27w3d   - Discussed signs or symptoms of when to call office   - Discussed fetal movement parameters  - 28 week labs completed   - discussed recommendations for TDAP immunization, patient already received TDAP. - Continue taking Prenatal Vitamin.   - The ACIP recommended pregnant patients be included in phase 1C of vaccine distribution. This decision is supported by [facility] and ACOG. As of 2021, there have been over 30,000 pregnant patients included in the V-safe post COVID vaccination safety . Most (73%) reports to VAERS among pregnant women involved non-pregnancyspecific adverse events (e.g., local and systemic reactions). Miscarriage was the most frequently reported pregnancy-specific adverse event to VAERS; numbers are within the known background rates based on presumed COVID-19 vaccine doses administered to pregnant women.  No unexpected pregnancy or infant outcomes have been observed related to  COVID-19 vaccination during

## 2023-08-27 NOTE — PROGRESS NOTES
Patricia Forrest is a  @ 36w4d who presents for JUNAID visit. She denies LOF, VB or Ctxs.  + FM. She denies any complaints. She denies any fevers/chills, SOB, cough, sore throat, loss of taste/smell or sick contacts. Pt denies any HA, vision changes or RUQ pain. O:  Vitals:    23 1121   BP: 117/74   Pulse: 80     Gen: NAD  Abd: soft, nontender, gravid  Ext:  no edema      BP: 117/74  Weight - Scale: 166 lb (75.3 kg)  Pulse: 80  Patient Position: Sitting  Fundal Height (cm): 36 cm  Fetal HR: 135  Movement: Present    A/P:  Patient Active Problem List    Diagnosis Date Noted    Rh negative state in antepartum period 2023     Priority: Medium    Encounter for supervision of normal first pregnancy in third trimester 2023     Discussed updated COVID precautions and policies. Reviewed updated visitor policy. Encouraged social distancing and appropriate hand washing/hygiene practices. Reviewed symptoms suspicious for COVID infection. Discussed that ACOG, SMFM, and the CDC recommend to not withold immunization in pregnant and breastfeeding women who meet criteria for receipt of the vaccine based on the ACIP recommended priority groups. All questions answered. Patient vocalized understanding.     GBS swab obtained  Discussed s/sx that should prompt call to the office  Discussed kick counts  Pt counseled to continue PNVs  RTC in 1 wks    Castillo Rodrigez MD

## 2023-08-28 ENCOUNTER — HOSPITAL ENCOUNTER (OUTPATIENT)
Age: 28
Setting detail: SPECIMEN
Discharge: HOME | End: 2023-08-28

## 2023-08-28 ENCOUNTER — ROUTINE PRENATAL (OUTPATIENT)
Dept: OBGYN CLINIC | Age: 28
End: 2023-08-28

## 2023-08-28 VITALS
DIASTOLIC BLOOD PRESSURE: 74 MMHG | HEART RATE: 80 BPM | BODY MASS INDEX: 24.51 KG/M2 | WEIGHT: 166 LBS | SYSTOLIC BLOOD PRESSURE: 117 MMHG

## 2023-08-28 DIAGNOSIS — Z34.03 ENCOUNTER FOR SUPERVISION OF NORMAL FIRST PREGNANCY IN THIRD TRIMESTER: ICD-10-CM

## 2023-08-28 DIAGNOSIS — Z3A.36 36 WEEKS GESTATION OF PREGNANCY: ICD-10-CM

## 2023-08-28 DIAGNOSIS — Z3A.36 36 WEEKS GESTATION OF PREGNANCY: Primary | ICD-10-CM

## 2023-08-28 PROCEDURE — 0502F SUBSEQUENT PRENATAL CARE: CPT | Performed by: OBSTETRICS & GYNECOLOGY

## 2023-08-30 PROBLEM — O99.820 GBS (GROUP B STREPTOCOCCUS CARRIER), +RV CULTURE, CURRENTLY PREGNANT: Status: ACTIVE | Noted: 2023-08-30

## 2023-08-30 LAB
MICROORGANISM SPEC CULT: ABNORMAL
SPECIMEN DESCRIPTION: ABNORMAL

## 2023-09-05 ENCOUNTER — ROUTINE PRENATAL (OUTPATIENT)
Dept: OBGYN CLINIC | Age: 28
End: 2023-09-05

## 2023-09-05 VITALS
SYSTOLIC BLOOD PRESSURE: 112 MMHG | HEART RATE: 76 BPM | WEIGHT: 167 LBS | BODY MASS INDEX: 24.66 KG/M2 | DIASTOLIC BLOOD PRESSURE: 75 MMHG

## 2023-09-05 DIAGNOSIS — Z3A.37 37 WEEKS GESTATION OF PREGNANCY: Primary | ICD-10-CM

## 2023-09-05 DIAGNOSIS — Z34.03 ENCOUNTER FOR SUPERVISION OF NORMAL FIRST PREGNANCY IN THIRD TRIMESTER: ICD-10-CM

## 2023-09-05 PROCEDURE — 0502F SUBSEQUENT PRENATAL CARE: CPT | Performed by: OBSTETRICS & GYNECOLOGY

## 2023-09-05 NOTE — PROGRESS NOTES
Marquita Peña is a  @ 37w5d who presents for JUNAID visit. She denies LOF, VB or Ctxs.  + FM. She is having some general discomfort in pregnancy. She denies any fevers/chills, SOB, cough, sore throat, loss of taste/smell or sick contacts. Pt denies any HA, vision changes or RUQ pain. O:  Vitals:    23 1110   BP: 112/75   Pulse: 76     Gen: NAD  Abd: soft, nontender, gravid  Ext:  no edema      BP: 112/75  Weight - Scale: 167 lb (75.8 kg)  Pulse: 76  Patient Position: Sitting  Fundal Height (cm): 37 cm  Fetal HR: 140  Movement: Present  Presentation: Vertex    A/P:  Patient Active Problem List    Diagnosis Date Noted    Rh negative state in antepartum period 2023     Priority: Medium    GBS (group B Streptococcus carrier), +RV culture, currently pregnant 2023     Needs PCN G in labor        Encounter for supervision of normal first pregnancy in third trimester 2023     Discussed updated COVID precautions and policies. Reviewed updated visitor policy. Encouraged social distancing and appropriate hand washing/hygiene practices. Reviewed symptoms suspicious for COVID infection. Discussed that ACOG, SMFM, and the CDC recommend to not withold immunization in pregnant and breastfeeding women who meet criteria for receipt of the vaccine based on the ACIP recommended priority groups. All questions answered. Patient vocalized understanding.     Reviewed GBS + results and need for PCN G in labor  Discussed s/sx that should prompt call to the office  Discussed kick counts  Pt counseled to continue PNVs  RTC in 1 wks    Stephanie Arias MD

## 2023-09-11 ENCOUNTER — ROUTINE PRENATAL (OUTPATIENT)
Dept: OBGYN CLINIC | Age: 28
End: 2023-09-11

## 2023-09-11 VITALS
WEIGHT: 170.6 LBS | DIASTOLIC BLOOD PRESSURE: 72 MMHG | HEART RATE: 75 BPM | SYSTOLIC BLOOD PRESSURE: 114 MMHG | BODY MASS INDEX: 25.19 KG/M2

## 2023-09-11 DIAGNOSIS — O99.820 GBS (GROUP B STREPTOCOCCUS CARRIER), +RV CULTURE, CURRENTLY PREGNANT: Primary | ICD-10-CM

## 2023-09-11 DIAGNOSIS — Z3A.38 38 WEEKS GESTATION OF PREGNANCY: ICD-10-CM

## 2023-09-11 DIAGNOSIS — Z67.91 RH NEGATIVE STATE IN ANTEPARTUM PERIOD: ICD-10-CM

## 2023-09-11 DIAGNOSIS — O26.899 RH NEGATIVE STATE IN ANTEPARTUM PERIOD: ICD-10-CM

## 2023-09-11 DIAGNOSIS — Z34.03 ENCOUNTER FOR SUPERVISION OF NORMAL FIRST PREGNANCY IN THIRD TRIMESTER: ICD-10-CM

## 2023-09-11 PROCEDURE — 0502F SUBSEQUENT PRENATAL CARE: CPT | Performed by: STUDENT IN AN ORGANIZED HEALTH CARE EDUCATION/TRAINING PROGRAM

## 2023-09-11 NOTE — PROGRESS NOTES
Prenatal Visit    Mike Ram is a 25 y.o. female  at 41w2d    The patient was seen and evaluated. Reports positive fetal movements. She denies headache, vision changes, RUQ pain, contractions, vaginal bleeding and leakage of fluid. The patient already received the T-Dap Vaccine (27-36 weeks) this pregnancy. The problem list reflects the active issues addressed during today's visit. Allergies:  Duricef [cefadroxil]    Vitals:    BP: 114/72  Weight - Scale: 170 lb 9.6 oz (77.4 kg)  Pulse: 75  Fundal Height (cm): 38 cm  Fetal HR: 141  Movement: Present  Presentation: Vertex  Dilation (cm): 3  Effacement: 50  Station: -3     Physical Exam:  SVE: 3 cm dilated, 50 effaced, -3 station, cervical position posterior    Labs:  Group Beta Strep collection was done. Sensitivities for clindamycin and erythromycin were not ordered. The patient was found to be GBS: positive    Assessment & Plan: Mike Ram is a 25 y.o. female  at 41w2d   - Discussed signs or symptoms of when to call office   - Discussed fetal movement parameters   - Declines RR IOL   - Continue taking Prenatal Vitamin.   - The ACIP recommended pregnant patients be included in phase 1C of vaccine distribution. This decision is supported by [facility] and ACOG. As of 2021, there have been over 30,000 pregnant patients included in the V-safe post COVID vaccination safety . Most (73%) reports to VAERS among pregnant women involved non-pregnancyspecific adverse events (e.g., local and systemic reactions). Miscarriage was the most frequently reported pregnancy-specific adverse event to VAERS; numbers are within the known background rates based on presumed COVID-19 vaccine doses administered to pregnant women. No unexpected pregnancy or infant outcomes have been observed related to  COVID-19 vaccination during pregnancy. Recommended patient proceed with vaccination.           Patient Active Problem List    Diagnosis Date Noted

## 2023-09-18 ENCOUNTER — ROUTINE PRENATAL (OUTPATIENT)
Dept: OBGYN CLINIC | Age: 28
End: 2023-09-18

## 2023-09-18 VITALS
WEIGHT: 171 LBS | BODY MASS INDEX: 25.91 KG/M2 | HEIGHT: 68 IN | DIASTOLIC BLOOD PRESSURE: 75 MMHG | HEART RATE: 78 BPM | SYSTOLIC BLOOD PRESSURE: 109 MMHG

## 2023-09-18 DIAGNOSIS — Z3A.39 39 WEEKS GESTATION OF PREGNANCY: ICD-10-CM

## 2023-09-18 DIAGNOSIS — Z34.03 ENCOUNTER FOR SUPERVISION OF NORMAL FIRST PREGNANCY IN THIRD TRIMESTER: Primary | ICD-10-CM

## 2023-09-18 PROCEDURE — 0502F SUBSEQUENT PRENATAL CARE: CPT | Performed by: NURSE PRACTITIONER

## 2023-09-18 NOTE — PROGRESS NOTES
Prenatal Visit    Danielito Lopez is a 25 y.o. female  at 43w3d    The patient was seen and evaluated. Reports positive fetal movements. She denies headache, vision changes, RUQ pain, contractions, vaginal bleeding and leakage of fluid. The patient already received the T-Dap Vaccine (27-36 weeks) this pregnancy. The patient declined the influenza vaccine this year. The problem list reflects the active issues addressed during today's visit. Allergies:  Duricef [cefadroxil]    Vitals:    BP: 109/75  Weight - Scale: 171 lb (77.6 kg)  Pulse: 78  Fundal Height (cm): 38 cm  Fetal HR: 142  Movement: Present  Presentation: Vertex  Dilation (cm): 3  Effacement: 50  Station: -3     Physical Exam:  SVE: 3/50/-3    Labs:  Group Beta Strep collection was done. positive   Sensitivities for clindamycin and erythromycin were not ordered. The patient was found to be GBS: positive    Assessment & Plan: Danielito Lopez is a 25 y.o. female  at 43w3d   - Discussed signs or symptoms of when to call office   - Discussed fetal movement parameters   - Declines RR IOL   - labor precautions given   - Continue taking Prenatal Vitamin.   - The ACIP recommended pregnant patients be included in phase 1C of vaccine distribution. This decision is supported by [facility] and ACOG. As of 2021, there have been over 30,000 pregnant patients included in the V-safe post COVID vaccination safety . Most (73%) reports to VAERS among pregnant women involved non-pregnancyspecific adverse events (e.g., local and systemic reactions). Miscarriage was the most frequently reported pregnancy-specific adverse event to VAERS; numbers are within the known background rates based on presumed COVID-19 vaccine doses administered to pregnant women. No unexpected pregnancy or infant outcomes have been observed related to  COVID-19 vaccination during pregnancy. Recommended patient proceed with vaccination.           Patient Active Problem List General

## 2023-09-18 NOTE — PROGRESS NOTES
The patient is being seen for a  ob pt .    The patient was asked if they would like a chaperone in the room during the exam..  The patient has respectfully declined

## 2023-09-20 ENCOUNTER — HOSPITAL ENCOUNTER (INPATIENT)
Age: 28
LOS: 1 days | Discharge: HOME | End: 2023-09-22
Attending: STUDENT IN AN ORGANIZED HEALTH CARE EDUCATION/TRAINING PROGRAM | Admitting: STUDENT IN AN ORGANIZED HEALTH CARE EDUCATION/TRAINING PROGRAM
Payer: COMMERCIAL

## 2023-09-21 ENCOUNTER — ANESTHESIA (OUTPATIENT)
Dept: LABOR AND DELIVERY | Age: 28
End: 2023-09-21
Payer: COMMERCIAL

## 2023-09-21 ENCOUNTER — ANESTHESIA EVENT (OUTPATIENT)
Dept: LABOR AND DELIVERY | Age: 28
End: 2023-09-21
Payer: COMMERCIAL

## 2023-09-21 PROBLEM — Z3A.40 40 WEEKS GESTATION OF PREGNANCY: Status: ACTIVE | Noted: 2023-09-21

## 2023-09-21 PROBLEM — Z34.03 ENCOUNTER FOR SUPERVISION OF NORMAL FIRST PREGNANCY IN THIRD TRIMESTER: Status: RESOLVED | Noted: 2023-07-16 | Resolved: 2023-09-21

## 2023-09-21 LAB
ABO + RH BLD: NORMAL
AMPHET UR QL SCN: NEGATIVE
ARM BAND NUMBER: NORMAL
BARBITURATES UR QL SCN: NEGATIVE
BASOPHILS # BLD: <0.03 K/UL (ref 0–0.2)
BASOPHILS NFR BLD: 0 % (ref 0–2)
BENZODIAZ UR QL: NEGATIVE
BLOOD BANK SAMPLE EXPIRATION: NORMAL
BLOOD GROUP ANTIBODIES SERPL: NEGATIVE
CANNABINOIDS UR QL SCN: NEGATIVE
COCAINE UR QL SCN: NEGATIVE
EOSINOPHIL # BLD: 0.06 K/UL (ref 0–0.44)
EOSINOPHILS RELATIVE PERCENT: 1 % (ref 1–4)
ERYTHROCYTE [DISTWIDTH] IN BLOOD BY AUTOMATED COUNT: 13.5 % (ref 11.8–14.4)
FENTANYL UR QL: NEGATIVE
HCT VFR BLD AUTO: 35 % (ref 36.3–47.1)
HGB BLD-MCNC: 11.7 G/DL (ref 11.9–15.1)
IMM GRANULOCYTES # BLD AUTO: 0.05 K/UL (ref 0–0.3)
IMM GRANULOCYTES NFR BLD: 1 %
LYMPHOCYTES NFR BLD: 1.54 K/UL (ref 1.1–3.7)
LYMPHOCYTES RELATIVE PERCENT: 15 % (ref 24–43)
MCH RBC QN AUTO: 30.5 PG (ref 25.2–33.5)
MCHC RBC AUTO-ENTMCNC: 33.4 G/DL (ref 28.4–34.8)
MCV RBC AUTO: 91.1 FL (ref 82.6–102.9)
METHADONE UR QL: NEGATIVE
MONOCYTES NFR BLD: 0.67 K/UL (ref 0.1–1.2)
MONOCYTES NFR BLD: 7 % (ref 3–12)
NEUTROPHILS NFR BLD: 76 % (ref 36–65)
NEUTS SEG NFR BLD: 7.86 K/UL (ref 1.5–8.1)
NRBC BLD-RTO: 0 PER 100 WBC
OPIATES UR QL SCN: NEGATIVE
OXYCODONE UR QL SCN: NEGATIVE
PCP UR QL SCN: NEGATIVE
PLATELET # BLD AUTO: 173 K/UL (ref 138–453)
PMV BLD AUTO: 10.2 FL (ref 8.1–13.5)
RBC # BLD AUTO: 3.84 M/UL (ref 3.95–5.11)
RESULT: NORMAL
T PALLIDUM AB SER QL IA: NONREACTIVE
TEST INFORMATION: NORMAL
WBC OTHER # BLD: 10.2 K/UL (ref 3.5–11.3)

## 2023-09-21 PROCEDURE — 86901 BLOOD TYPING SEROLOGIC RH(D): CPT

## 2023-09-21 PROCEDURE — 7200000001 HC VAGINAL DELIVERY

## 2023-09-21 PROCEDURE — 88307 TISSUE EXAM BY PATHOLOGIST: CPT

## 2023-09-21 PROCEDURE — 6360000002 HC RX W HCPCS: Performed by: NURSE ANESTHETIST, CERTIFIED REGISTERED

## 2023-09-21 PROCEDURE — 36415 COLL VENOUS BLD VENIPUNCTURE: CPT

## 2023-09-21 PROCEDURE — 0HQ9XZZ REPAIR PERINEUM SKIN, EXTERNAL APPROACH: ICD-10-PCS | Performed by: STUDENT IN AN ORGANIZED HEALTH CARE EDUCATION/TRAINING PROGRAM

## 2023-09-21 PROCEDURE — 86780 TREPONEMA PALLIDUM: CPT

## 2023-09-21 PROCEDURE — 3700000025 EPIDURAL BLOCK: Performed by: ANESTHESIOLOGY

## 2023-09-21 PROCEDURE — 6370000000 HC RX 637 (ALT 250 FOR IP)

## 2023-09-21 PROCEDURE — 6360000002 HC RX W HCPCS

## 2023-09-21 PROCEDURE — 10907ZC DRAINAGE OF AMNIOTIC FLUID, THERAPEUTIC FROM PRODUCTS OF CONCEPTION, VIA NATURAL OR ARTIFICIAL OPENING: ICD-10-PCS | Performed by: STUDENT IN AN ORGANIZED HEALTH CARE EDUCATION/TRAINING PROGRAM

## 2023-09-21 PROCEDURE — 6360000002 HC RX W HCPCS: Performed by: STUDENT IN AN ORGANIZED HEALTH CARE EDUCATION/TRAINING PROGRAM

## 2023-09-21 PROCEDURE — 6370000000 HC RX 637 (ALT 250 FOR IP): Performed by: STUDENT IN AN ORGANIZED HEALTH CARE EDUCATION/TRAINING PROGRAM

## 2023-09-21 PROCEDURE — 1220000000 HC SEMI PRIVATE OB R&B

## 2023-09-21 PROCEDURE — 2580000003 HC RX 258

## 2023-09-21 PROCEDURE — 51701 INSERT BLADDER CATHETER: CPT

## 2023-09-21 PROCEDURE — 85025 COMPLETE CBC W/AUTO DIFF WBC: CPT

## 2023-09-21 PROCEDURE — 85461 HEMOGLOBIN FETAL: CPT

## 2023-09-21 PROCEDURE — 96372 THER/PROPH/DIAG INJ SC/IM: CPT

## 2023-09-21 PROCEDURE — 80307 DRUG TEST PRSMV CHEM ANLYZR: CPT

## 2023-09-21 PROCEDURE — 2580000003 HC RX 258: Performed by: STUDENT IN AN ORGANIZED HEALTH CARE EDUCATION/TRAINING PROGRAM

## 2023-09-21 PROCEDURE — 86900 BLOOD TYPING SEROLOGIC ABO: CPT

## 2023-09-21 PROCEDURE — 59400 OBSTETRICAL CARE: CPT | Performed by: STUDENT IN AN ORGANIZED HEALTH CARE EDUCATION/TRAINING PROGRAM

## 2023-09-21 PROCEDURE — 6360000002 HC RX W HCPCS: Performed by: ANESTHESIOLOGY

## 2023-09-21 PROCEDURE — 2500000003 HC RX 250 WO HCPCS: Performed by: NURSE ANESTHETIST, CERTIFIED REGISTERED

## 2023-09-21 PROCEDURE — 86850 RBC ANTIBODY SCREEN: CPT

## 2023-09-21 RX ORDER — IBUPROFEN 600 MG/1
600 TABLET ORAL EVERY 6 HOURS PRN
Qty: 40 TABLET | Refills: 1 | Status: SHIPPED | OUTPATIENT
Start: 2023-09-21

## 2023-09-21 RX ORDER — SODIUM CHLORIDE, SODIUM LACTATE, POTASSIUM CHLORIDE, CALCIUM CHLORIDE 600; 310; 30; 20 MG/100ML; MG/100ML; MG/100ML; MG/100ML
INJECTION, SOLUTION INTRAVENOUS CONTINUOUS
Status: DISCONTINUED | OUTPATIENT
Start: 2023-09-21 | End: 2023-09-21

## 2023-09-21 RX ORDER — ROPIVACAINE HYDROCHLORIDE 2 MG/ML
INJECTION, SOLUTION EPIDURAL; INFILTRATION; PERINEURAL
Status: COMPLETED
Start: 2023-09-21 | End: 2023-09-21

## 2023-09-21 RX ORDER — SODIUM CHLORIDE, SODIUM LACTATE, POTASSIUM CHLORIDE, AND CALCIUM CHLORIDE .6; .31; .03; .02 G/100ML; G/100ML; G/100ML; G/100ML
1000 INJECTION, SOLUTION INTRAVENOUS PRN
Status: DISCONTINUED | OUTPATIENT
Start: 2023-09-21 | End: 2023-09-21

## 2023-09-21 RX ORDER — DOCUSATE SODIUM 100 MG/1
100 CAPSULE, LIQUID FILLED ORAL 2 TIMES DAILY
Status: DISCONTINUED | OUTPATIENT
Start: 2023-09-21 | End: 2023-09-22 | Stop reason: HOSPADM

## 2023-09-21 RX ORDER — SODIUM CHLORIDE 0.9 % (FLUSH) 0.9 %
5-40 SYRINGE (ML) INJECTION PRN
Status: DISCONTINUED | OUTPATIENT
Start: 2023-09-21 | End: 2023-09-22 | Stop reason: HOSPADM

## 2023-09-21 RX ORDER — ONDANSETRON 2 MG/ML
4 INJECTION INTRAMUSCULAR; INTRAVENOUS EVERY 6 HOURS PRN
Status: DISCONTINUED | OUTPATIENT
Start: 2023-09-21 | End: 2023-09-22 | Stop reason: HOSPADM

## 2023-09-21 RX ORDER — SODIUM CHLORIDE 0.9 % (FLUSH) 0.9 %
5-40 SYRINGE (ML) INJECTION EVERY 12 HOURS SCHEDULED
Status: DISCONTINUED | OUTPATIENT
Start: 2023-09-21 | End: 2023-09-22 | Stop reason: HOSPADM

## 2023-09-21 RX ORDER — HYDROCORTISONE 25 MG/G
CREAM TOPICAL
Status: DISCONTINUED | OUTPATIENT
Start: 2023-09-21 | End: 2023-09-22 | Stop reason: HOSPADM

## 2023-09-21 RX ORDER — ONDANSETRON 2 MG/ML
4 INJECTION INTRAMUSCULAR; INTRAVENOUS EVERY 6 HOURS PRN
Status: DISCONTINUED | OUTPATIENT
Start: 2023-09-21 | End: 2023-09-21

## 2023-09-21 RX ORDER — SODIUM CHLORIDE 0.9 % (FLUSH) 0.9 %
5-40 SYRINGE (ML) INJECTION PRN
Status: DISCONTINUED | OUTPATIENT
Start: 2023-09-21 | End: 2023-09-21

## 2023-09-21 RX ORDER — ACETAMINOPHEN 500 MG
1000 TABLET ORAL EVERY 6 HOURS PRN
Qty: 30 TABLET | Refills: 1 | Status: SHIPPED | OUTPATIENT
Start: 2023-09-21

## 2023-09-21 RX ORDER — IBUPROFEN 600 MG/1
600 TABLET ORAL EVERY 6 HOURS
Status: DISCONTINUED | OUTPATIENT
Start: 2023-09-21 | End: 2023-09-22 | Stop reason: HOSPADM

## 2023-09-21 RX ORDER — SODIUM CHLORIDE, SODIUM LACTATE, POTASSIUM CHLORIDE, AND CALCIUM CHLORIDE .6; .31; .03; .02 G/100ML; G/100ML; G/100ML; G/100ML
500 INJECTION, SOLUTION INTRAVENOUS PRN
Status: DISCONTINUED | OUTPATIENT
Start: 2023-09-21 | End: 2023-09-21

## 2023-09-21 RX ORDER — LANOLIN 72 %
OINTMENT (GRAM) TOPICAL PRN
Status: DISCONTINUED | OUTPATIENT
Start: 2023-09-21 | End: 2023-09-22 | Stop reason: HOSPADM

## 2023-09-21 RX ORDER — NALOXONE HYDROCHLORIDE 0.4 MG/ML
INJECTION, SOLUTION INTRAMUSCULAR; INTRAVENOUS; SUBCUTANEOUS PRN
Status: DISCONTINUED | OUTPATIENT
Start: 2023-09-21 | End: 2023-09-21

## 2023-09-21 RX ORDER — SODIUM CHLORIDE 0.9 % (FLUSH) 0.9 %
5-40 SYRINGE (ML) INJECTION EVERY 12 HOURS SCHEDULED
Status: DISCONTINUED | OUTPATIENT
Start: 2023-09-21 | End: 2023-09-21

## 2023-09-21 RX ORDER — SODIUM CHLORIDE 9 MG/ML
INJECTION, SOLUTION INTRAVENOUS PRN
Status: DISCONTINUED | OUTPATIENT
Start: 2023-09-21 | End: 2023-09-21

## 2023-09-21 RX ORDER — DIPHENHYDRAMINE HCL 25 MG
25 TABLET ORAL EVERY 4 HOURS PRN
Status: DISCONTINUED | OUTPATIENT
Start: 2023-09-21 | End: 2023-09-21

## 2023-09-21 RX ORDER — LIDOCAINE HYDROCHLORIDE AND EPINEPHRINE 15; 5 MG/ML; UG/ML
INJECTION, SOLUTION EPIDURAL PRN
Status: DISCONTINUED | OUTPATIENT
Start: 2023-09-21 | End: 2023-09-21 | Stop reason: SDUPTHER

## 2023-09-21 RX ORDER — ACETAMINOPHEN 500 MG
1000 TABLET ORAL EVERY 6 HOURS PRN
Status: DISCONTINUED | OUTPATIENT
Start: 2023-09-21 | End: 2023-09-21

## 2023-09-21 RX ORDER — ACETAMINOPHEN 500 MG
1000 TABLET ORAL EVERY 6 HOURS PRN
Status: DISCONTINUED | OUTPATIENT
Start: 2023-09-21 | End: 2023-09-22 | Stop reason: HOSPADM

## 2023-09-21 RX ORDER — MORPHINE SULFATE 10 MG/ML
10 INJECTION, SOLUTION INTRAMUSCULAR; INTRAVENOUS ONCE
Status: DISCONTINUED | OUTPATIENT
Start: 2023-09-21 | End: 2023-09-21

## 2023-09-21 RX ORDER — PROCHLORPERAZINE EDISYLATE 5 MG/ML
10 INJECTION INTRAMUSCULAR; INTRAVENOUS EVERY 6 HOURS PRN
Status: DISCONTINUED | OUTPATIENT
Start: 2023-09-21 | End: 2023-09-21

## 2023-09-21 RX ORDER — LIDOCAINE HYDROCHLORIDE 10 MG/ML
INJECTION, SOLUTION EPIDURAL; INFILTRATION; INTRACAUDAL; PERINEURAL PRN
Status: DISCONTINUED | OUTPATIENT
Start: 2023-09-21 | End: 2023-09-21 | Stop reason: SDUPTHER

## 2023-09-21 RX ORDER — ROPIVACAINE HYDROCHLORIDE 2 MG/ML
INJECTION, SOLUTION EPIDURAL; INFILTRATION; PERINEURAL PRN
Status: DISCONTINUED | OUTPATIENT
Start: 2023-09-21 | End: 2023-09-21 | Stop reason: SDUPTHER

## 2023-09-21 RX ORDER — SENNA AND DOCUSATE SODIUM 50; 8.6 MG/1; MG/1
1 TABLET, FILM COATED ORAL 2 TIMES DAILY
Qty: 30 TABLET | Refills: 1 | Status: SHIPPED | OUTPATIENT
Start: 2023-09-21

## 2023-09-21 RX ORDER — SODIUM CHLORIDE 9 MG/ML
INJECTION, SOLUTION INTRAVENOUS PRN
Status: DISCONTINUED | OUTPATIENT
Start: 2023-09-21 | End: 2023-09-22 | Stop reason: HOSPADM

## 2023-09-21 RX ORDER — SIMETHICONE 80 MG
80 TABLET,CHEWABLE ORAL EVERY 6 HOURS PRN
Status: DISCONTINUED | OUTPATIENT
Start: 2023-09-21 | End: 2023-09-22 | Stop reason: HOSPADM

## 2023-09-21 RX ADMIN — DOCUSATE SODIUM 100 MG: 100 CAPSULE, LIQUID FILLED ORAL at 11:46

## 2023-09-21 RX ADMIN — SODIUM CHLORIDE, POTASSIUM CHLORIDE, SODIUM LACTATE AND CALCIUM CHLORIDE: 600; 310; 30; 20 INJECTION, SOLUTION INTRAVENOUS at 00:55

## 2023-09-21 RX ADMIN — ONDANSETRON 4 MG: 2 INJECTION INTRAMUSCULAR; INTRAVENOUS at 01:58

## 2023-09-21 RX ADMIN — ROPIVACAINE HYDROCHLORIDE 5 ML: 2 INJECTION, SOLUTION EPIDURAL; INFILTRATION; PERINEURAL at 03:34

## 2023-09-21 RX ADMIN — ACETAMINOPHEN 1000 MG: 500 TABLET ORAL at 11:46

## 2023-09-21 RX ADMIN — ACETAMINOPHEN 1000 MG: 500 TABLET ORAL at 01:07

## 2023-09-21 RX ADMIN — Medication 30 UNITS: at 08:54

## 2023-09-21 RX ADMIN — SODIUM CHLORIDE 5 MILLION UNITS: 900 INJECTION INTRAVENOUS at 01:04

## 2023-09-21 RX ADMIN — IBUPROFEN 600 MG: 600 TABLET, FILM COATED ORAL at 18:02

## 2023-09-21 RX ADMIN — HUMAN RHO(D) IMMUNE GLOBULIN 300 MCG: 300 INJECTION, SOLUTION INTRAMUSCULAR at 18:03

## 2023-09-21 RX ADMIN — Medication 166.7 ML: at 08:33

## 2023-09-21 RX ADMIN — IBUPROFEN 600 MG: 600 TABLET, FILM COATED ORAL at 11:46

## 2023-09-21 RX ADMIN — LIDOCAINE HYDROCHLORIDE 3 ML: 10 INJECTION, SOLUTION EPIDURAL; INFILTRATION; INTRACAUDAL; PERINEURAL at 03:20

## 2023-09-21 RX ADMIN — ROPIVACAINE HYDROCHLORIDE 10 ML/HR: 2 INJECTION, SOLUTION EPIDURAL; INFILTRATION at 03:35

## 2023-09-21 RX ADMIN — ACETAMINOPHEN 1000 MG: 500 TABLET ORAL at 18:02

## 2023-09-21 RX ADMIN — LIDOCAINE HYDROCHLORIDE,EPINEPHRINE BITARTRATE 3 ML: 15; .005 INJECTION, SOLUTION EPIDURAL; INFILTRATION; INTRACAUDAL; PERINEURAL at 03:24

## 2023-09-21 RX ADMIN — BENZOCAINE AND LEVOMENTHOL: 200; 5 SPRAY TOPICAL at 11:46

## 2023-09-21 RX ADMIN — DOCUSATE SODIUM 100 MG: 100 CAPSULE, LIQUID FILLED ORAL at 20:46

## 2023-09-21 RX ADMIN — SODIUM CHLORIDE, PRESERVATIVE FREE 10 ML: 5 INJECTION INTRAVENOUS at 20:46

## 2023-09-21 RX ADMIN — Medication 2.5 MILLION UNITS: at 05:06

## 2023-09-21 RX ADMIN — ROPIVACAINE HYDROCHLORIDE 5 ML: 2 INJECTION, SOLUTION EPIDURAL; INFILTRATION; PERINEURAL at 03:29

## 2023-09-21 ASSESSMENT — PAIN DESCRIPTION - DESCRIPTORS
DESCRIPTORS: CRAMPING;DISCOMFORT
DESCRIPTORS: CRAMPING
DESCRIPTORS: CRAMPING;DISCOMFORT

## 2023-09-21 ASSESSMENT — PAIN SCALES - GENERAL
PAINLEVEL_OUTOF10: 5
PAINLEVEL_OUTOF10: 2
PAINLEVEL_OUTOF10: 8

## 2023-09-21 ASSESSMENT — PAIN DESCRIPTION - ORIENTATION
ORIENTATION: LOWER

## 2023-09-21 ASSESSMENT — PAIN - FUNCTIONAL ASSESSMENT
PAIN_FUNCTIONAL_ASSESSMENT: ACTIVITIES ARE NOT PREVENTED
PAIN_FUNCTIONAL_ASSESSMENT: ACTIVITIES ARE NOT PREVENTED

## 2023-09-21 ASSESSMENT — PAIN DESCRIPTION - LOCATION
LOCATION: ABDOMEN
LOCATION: VAGINA;ABDOMEN
LOCATION: VAGINA

## 2023-09-21 ASSESSMENT — LIFESTYLE VARIABLES: SMOKING_STATUS: 0

## 2023-09-21 NOTE — DISCHARGE SUMMARY
Obstetric Discharge Summary  [address]    Patient Name: Pavel Tenorio  Patient : 1995  Primary Care Physician: Delois Goldberg, APRN - CNP  Admit Date: 2023    Principal Diagnosis: IUP at 40w0d, admitted for spontaneous labor     Her pregnancy has been complicated by:   Patient Active Problem List   Diagnosis    Rh negative state in antepartum period    GBS (group B Streptococcus carrier), +RV culture, currently pregnant    40 weeks gestation of pregnancy     23 M Apg 8/9 Wt 7#14     (spontaneous vaginal delivery)       Infection Present?: No  Hospital Acquired: N/A    Surgical Operations & Procedures:  Analgesia: epidural  Delivery Type: Spontaneous Vaginal Delivery: See Labor and Delivery Summary   Laceration(s): Left Sulcal and First Degree Perineal     Consultations: Anesthesia    Pertinent Findings & Procedures: Pavel Tenorio is a 25 y.o. female  at 37w0d admitted for spontaneous labor; received Pen G, epidural, AROM. She delivered by spontaneous vaginal a Live Born infant on 23. Information for the patient's :  João Soto [[id number]]   male   Birth Weight: 7 lb 14.3 oz (3.58 kg)     Apgars: 8 at 1 minute and 9 at 5 minutes.      Postpartum course: normal.      Course of patient: uncomplicated    Discharge to: Home    Readmission planned: no     Indication for 6 week PP 2 hour GTT?: no     Recommendations on Discharge:     Medications:      Medication List        START taking these medications      acetaminophen 500 MG tablet  Commonly known as: TYLENOL  Take 2 tablets by mouth every 6 hours as needed for Pain     ibuprofen 600 MG tablet  Commonly known as: ADVIL;MOTRIN  Take 1 tablet by mouth every 6 hours as needed for Pain     sennosides-docusate sodium 8.6-50 MG tablet  Commonly known as: SENOKOT-S  Take 1 tablet by mouth in the morning and at bedtime            CONTINUE taking these medications      Breast Pump Misc  1 each by Does

## 2023-09-21 NOTE — FLOWSHEET NOTE
Pt. Presented to L&D for CTX. Pt has Positive FM, Positive ctx, Positives SROM around 2030, and negative Bloody-show.

## 2023-09-21 NOTE — CARE COORDINATION
CASE MANAGEMENT POST-PARTUM TRANSITIONAL CARE PLAN    40 weeks gestation of pregnancy [Z3A.40]    OB Provider: Dr. Leyla Donovan met w/ Dinora Kennedy, her  Boo Celeste and her mom at her bedside to discuss DCP. She is S/P  on 23 @ 40w0d at 1 of male infant    Writer verified address/phone number correct on facesheet. She states she lives with her . She verbalized no difficulties with transportation to and from doctors appointments or with paying for medications upon discharge home. [employer] PPO insurance correct. Writer notified Michael Gaxiola they have 30 days from date of birth to add  to insurance policy. They verbalized understanding. They confirmed a safe place for infant to sleep at home. Infant name on BC: Agustín Gilbert. Infant PCP KAREEM Lau. DME: none  HOME CARE: none    Anticipate DC home of couplet in private vehicle in 1-2 days status post vaginal delivery.       Readmission Risk              Risk of Unplanned Readmission:  5

## 2023-09-21 NOTE — CARE COORDINATION
Social Work     Sw reviewed medical record (current active problem list) and tox screens and found no current concerns. Sw spoke with mom briefly to explain Sw role, inquire if any needs or concerns, and provide safe sleep education and discuss. Mom denied any needs or questions and informs baby has a safe sleep environment (bassinet and a crib for when baby is older). Mom denied any current s/s of anxiety or depression and is aware to reach out to OB if any s/s occur after dc. Mom reports a really good support system and denied any current questions or needs. moms support sytem consists of father of baby Gudelia Sanderson), baby's grandparents, and baby's aunts and uncles. Mom reports this is her 1st baby. Mom resides with baby's dad. Mom states that she is unsure who peds will be.  will give them a list.     Mom stated that she was looking into see of they qualify for [facility]. Sw encouraged mom to reach out if any issues or concerns arise.     Documented by sw Intern Porfirio Frederick

## 2023-09-21 NOTE — LACTATION NOTE
Lactation round made. Mother wishes to breastfeed. Mother states she nursed in recovery and it went well, reports strong tug. Mother has a Spectra 1 breast pump. [facility] Assisted with putting to breast in football position. Educated on positioning, hand expression, and latching. Demonstrated how to hand express and offer colostrum to . Drops observed and given to baby. Educated on deep latch, deep latch achieved. Lips flanged. Mother reports strong tug. Educated on normal  feeds and cluster feeding. Encouraged mother to call out for assistance. BF Packet given.

## 2023-09-21 NOTE — L&D DELIVERY NOTE
[place], [address] [facility] [[id number]]      Labor Events     Labor: No   Steroids: None  Cervical Ripening Date/Time:      Antibiotics Received during Labor: Yes  Rupture Identifier: Sac 1  Rupture Date/Time:  23 05:51:00   Rupture Type: AROM  Fluid Color: Clear  Fluid Odor: None  Labor Complications: None              Anesthesia    Method: Epidural       Labor Event Times      Labor onset date/time:        Dilation complete date/time:  23 07:37:00 EDT     Start pushing date/time:  2023 07:39:40   Decision date/time (emergent ):            Delivery Details      Delivery Date: 23 Delivery Time: 08:28:00   Delivery Type: Vaginal, Spontaneous               Presentation    Presentation: Vertex       Shoulder Dystocia    Shoulder Dystocia Present?: No                                                                                                           Assisted Delivery Details    Forceps Attempted?: No  Vacuum Extractor Attempted?: No                                                             Cord    Complications: None  Delayed Cord Clamping?: Yes  Cord Clamped Date/Time: 2023 08:29:00  Cord Blood Disposition: Lab              Placenta    Date/Time: 2023 08:33:55  Removal: Spontaneous  Appearance: Intact  Disposition: Lab, Pathology       Lacerations    Episiotomy: None  Perineal Lacerations: None  Other Lacerations: sulcus laceration  Sulcus Laceration: bilateral Repaired?: Yes   Number of Repair Packets: 3       Vaginal Counts    Initial Count Personnel: [facility]  Initial Count Verified By: Jessenia Marie Sponge Count: Correct Intial Needles Count: Correct Intial Instruments Count: Correct   Final Count Personnel: [facility]  Final Count Verified By: Merary Garza  Accurate Final Count?: Yes       Blood Loss  Mother: Renata Valdes #[id number]     Start of Mother's Information      Delivery Blood Loss  23 - 23 0917      None                 End of Mother's epidural, became complete and felt the urge to push. After pushing with contractions the fetal head delivered Cephalic, right occiput anterior over an intact perineum, nuchal cord was not present. The anterior, then posterior shoulder delivered easily and atraumatically followed by the rest of the infant. Nose and mouth suctioned with bulb suction, infant was stimulated and dried. Cord was clamped and cut after one minute delayed cord clamping and infant was placed on maternal chest, and attended by RN for evaluation. The delivery of the placenta was spontaneous and appeared intact. Pitocin was started. The vagina was swept of all clots and debris. The perineum and vagina were evaluated and a left sulcal and 1st degree midline laceration was found and repaired in standard fashion with 3-0 vicryl. Mother and baby tolerated procedure well. Dr. Rosi Garcia was present for entire delivery. Delivery Summary:  Labor & Delivery Summary  Dilation Complete Date: 09/21/23  Dilation Complete Time: 0797    Specimen: placenta sent to pathology, cord blood, and cord gases  Quantitative blood loss:  200ml immediately following delivery  Condition:  infant stable to general nursery and mother stable  Counts: instrument and sponge counts correct  Blood Type and Rh: A NEGATIVE    Rubella Immunity Status: immune  Infant Feeding: breast feeding    Airam Bansal MD  Ob/Gyn Resident  9/21/2023, 9:17 AM    Date: 9/21/2023  Time: 10:41 AM    Attending Attestation:   I was present and scrubbed for the entire procedure.     Attending Name: Harmony Casey DO

## 2023-09-21 NOTE — CARE COORDINATION
ANTEPARTUM NOTE    40 weeks gestation of pregnancy [Z3A.40]    Yessi was admitted to L&D on 9/20/23 for spontaneous labor @ 40w0d    OB GYN Provider: Dr. Zohra Mccauley    Will meet with patient after delivery to verify name/address/phone/insurance and discuss discharge planning. Anticipate DC home 2 nights after vaginal delivery or 4 nights after C/S delivery as long as hemodynamically stable.

## 2023-09-21 NOTE — ANESTHESIA PRE PROCEDURE
blood products.      Attending anesthesiologist reviewed and agrees with Preprocedure content                Carl Saint, APRN - CRNA   9/21/2023

## 2023-09-21 NOTE — ANESTHESIA PROCEDURE NOTES
Epidural Block    Patient location during procedure: OB  Reason for block: labor epidural  Staffing  Performed: resident/CRNA   Resident/CRNA: KAREEM Cabrera CRNA  Performed by: KAREEM Cabrera CRNA  Authorized by: Yumiko Funez MD    Epidural  Patient position: sitting  Prep: Betadine and site prepped and draped  Patient monitoring: frequent blood pressure checks  Approach: midline  Location: L2-3  Injection technique: CORNELL air and CORNELL saline  Provider prep: mask and sterile gloves  Needle  Needle type: Tuohy   Needle gauge: 17 G  Needle length: 3.5 in  Needle insertion depth: 5.5 cm  Catheter type: side hole  Catheter size: 19 G  Catheter at skin depth: 14 cm  Test dose: negativeCatheter Secured: tegaderm and tape  Assessment  Sensory level: T8  Hemodynamics: stable  Attempts: 1  Outcomes: uncomplicated and patient tolerated procedure well  Preanesthetic Checklist  Completed: patient identified, IV checked, risks and benefits discussed, surgical/procedural consents, equipment checked, pre-op evaluation, timeout performed, anesthesia consent given, oxygen available, monitors applied/VS acknowledged, fire risk safety assessment completed and verbalized and blood product R/B/A discussed and consented

## 2023-09-21 NOTE — FLOWSHEET NOTE
Pt admitted to room 747 per wheelchair in stable condition from L&D  Oriented to room and surroundings  Bed in lowest position, wheels locked, 2/4 side rails up  Call light in reach, room free of clutter, adequate lighting provided  Denies any further questions at this time  Instructed to call out with any questions/concerns/new onset of pain and/or n/v   White board updated  Continue to monitor with hourly rounding  Non-skid socks on/at bedside

## 2023-09-21 NOTE — H&P
PP    Contractions   - VSS   - cEFM/TOCO: Category I tracing, contractions q3-4 min   - SVE: 5/70/bulging membranes   - ATSO Dr. Red Boateng for spontaneous labor   - CBC, T&S,Tpal, UDS ordered   - LR @125 mL/hr   - Pen G for GBS prophylaxis   - BSUS: cephalic presentation, EFW 7#7   - Continue expectant management    Loss of fluid   - SSE: scant discharge present, negative pooling, negative valsalva, negative nitrazine   - Bulging bag of membranes felt on SVE   - Membranes intact    Hx Migraines   - Patient used to take Excedrin outside of pregnancy, currently taking Mag Oxide daily   - Clinically asymptomatic    BMI 24    Patient Active Problem List    Diagnosis Date Noted    Rh negative state in antepartum period 03/06/2023     Priority: Medium    40 weeks gestation of pregnancy 09/21/2023    GBS (group B Streptococcus carrier), +RV culture, currently pregnant 08/30/2023     Needs PCN G in labor         Plan discussed with Dr. Red Boateng, who is agreeable. Steroids given this admission: No    Risks, benefits, alternatives and possible complications have been discussed in detail with the patient. Admission, and post admission procedures and expectations were discussed in detail. All questions were answered. Attending's Name: Dr. Vilma Bedolla DO  Ob/Gyn Resident  9/21/2023, 12:58 AM    Attending Physician Statement  I have discussed the care of [facility], including pertinent history and exam findings with the resident. I have reviewed and edited their note in the electronic medical record. The key elements of all parts of the encounter have been performed/reviewed by me . I agree with the assessment, plan and orders as documented by the resident. The level of care submitted represents to the best of my ability the care documented in the medical record today. GC Modifier. This service has been performed in part by a resident under the direction of a teaching physician.       Attending's Name:

## 2023-09-21 NOTE — PROGRESS NOTES
Labor Progress Note    Cecilia Rogers is a 25 y.o. female  at 37w0d  The patient was seen and examined. Her pain is  well controlled with epidural. She reports fetal movement is present, complains of contractions, denies loss of fluid, denies vaginal bleeding. Vital Signs:  Vitals:    23 0327 23 0329 23 0330 23 0331   BP: 115/73 116/72 116/72 120/73   Pulse: 80 89 89 80   Resp:  16 16 16   Temp:       TempSrc:       SpO2:   97% 97%   Weight:       Height:             FHT: 130, moderate variability, accelerations present, rare decelerations with spontaneous return to baseline; overall reassuring  Contractions: regular 1-3 minutes    Chaperone for Intimate Exam: Chaperone was present for entire exam, Chaperone Name: Lisa Viera RN  Cervical Exam: /-1  Pitocin: @ 0 mu/min    Membranes: Intact  Scalp Electrode in place: absent  Intrauterine Pressure Catheter in Place: absent    Interventions: SVE    Assessment/Plan:   Cecilia Rogers is a 25 y.o. female  at 37w0d admitted for spontaneous labor   - GBS positive, Pen G for GBS prophylaxis   - VSS, afebrile   - cEFM/TOCO   - Epidural in place with adequate pain control   - Continue to monitor closely        Attending updated and in agreement with plan    Mirna Snow DO  Ob/Gyn Resident  2023, 4:22 AM

## 2023-09-21 NOTE — ANESTHESIA POSTPROCEDURE EVALUATION
Department of Anesthesiology  Postprocedure Note    Patient: Barbara Arellano  MRN: [id number]  YOB: 1995  Date of evaluation: 9/21/2023      Procedure Summary     Date: 09/21/23 Room / Location:     Anesthesia Start: 9219 Anesthesia Stop: Javier Blush    Procedure: Labor Analgesia Diagnosis:     Scheduled Providers:  Responsible Provider: Aida Khan MD    Anesthesia Type: epidural ASA Status: 2          Anesthesia Type: No value filed.     Travis Phase I: Travis Score: 9    Travis Phase II: Travis Score: 9      Anesthesia Post Evaluation    Patient location during evaluation: floor  Patient participation: complete - patient participated  Level of consciousness: awake and alert  Airway patency: patent  Nausea & Vomiting: no nausea and no vomiting  Complications: no  Cardiovascular status: hemodynamically stable  Respiratory status: acceptable  Hydration status: stable  Pain management: adequate

## 2023-09-22 VITALS
RESPIRATION RATE: 16 BRPM | WEIGHT: 171 LBS | OXYGEN SATURATION: 100 % | TEMPERATURE: 97.9 F | BODY MASS INDEX: 24.48 KG/M2 | SYSTOLIC BLOOD PRESSURE: 109 MMHG | HEIGHT: 70 IN | DIASTOLIC BLOOD PRESSURE: 63 MMHG | HEART RATE: 74 BPM

## 2023-09-22 LAB
COMPONENT: NORMAL
STATUS OF UNITS: NORMAL
TRANSFUSION STATUS: NORMAL
UNIT DIVISION: 0
UNIT NUMBER: NORMAL

## 2023-09-22 PROCEDURE — 6370000000 HC RX 637 (ALT 250 FOR IP)

## 2023-09-22 RX ADMIN — ACETAMINOPHEN 1000 MG: 500 TABLET ORAL at 00:20

## 2023-09-22 RX ADMIN — ACETAMINOPHEN 1000 MG: 500 TABLET ORAL at 06:00

## 2023-09-22 RX ADMIN — IBUPROFEN 600 MG: 600 TABLET, FILM COATED ORAL at 06:01

## 2023-09-22 RX ADMIN — DOCUSATE SODIUM 100 MG: 100 CAPSULE, LIQUID FILLED ORAL at 08:34

## 2023-09-22 RX ADMIN — IBUPROFEN 600 MG: 600 TABLET, FILM COATED ORAL at 00:20

## 2023-09-22 ASSESSMENT — PAIN DESCRIPTION - LOCATION: LOCATION: VAGINA

## 2023-09-22 ASSESSMENT — PAIN DESCRIPTION - DESCRIPTORS: DESCRIPTORS: SORE

## 2023-09-22 ASSESSMENT — PAIN SCALES - GENERAL: PAINLEVEL_OUTOF10: 2

## 2023-09-22 NOTE — PROGRESS NOTES
CLINICAL PHARMACY NOTE: MEDS TO BEDS    Total # of Prescriptions Filled: 3   The following medications were delivered to the patient:  Acetaminophen  Senna  ibuprofen    Additional Documentation:   $4.99 christina helton

## 2023-09-22 NOTE — LACTATION NOTE
Pt sates latching and feeding has been going well, some sleepiness in  that was happening over night, but overall very well.  Pt given lanolin per request. Encouraged skin to skin, reviewed deep latch, signs of milk transfer, and how to know if baby is getting enough at breast.

## 2023-09-22 NOTE — DISCHARGE INSTRUCTIONS
Follow-up with your OB doctor in 2 weeks or as specified by your physician. Please refer to A NEW BEGINNING- YOUR PERSONAL GUIDE TO POSTPARTUM CARE book provided in your room. Please take this book home with you to refer to. For Breastfeeding moms, you can contact our lactation specialist,  with any problems or questions you may have. Phone number [phone]. Feel free to leave voice mail and your call will be returned as soon as possible. DIET  Eat a well balanced diet focusing on foods high in fiber and protein. Drink 8-10 glasses of fluids daily, especially water. To avoid constipation you may take a mild stool softener as recommended by your doctor or midwife. ACTIVITY  Gradually increase your activity. Resume exercise regimen only after advise by your doctor or midwife. Avoid lifting anything heavier than your baby or a gallon of milk for SIX weeks. Avoid driving 1 week for vaginal delivery and 2 weeks for  section, unless otherwise instructed by physician. Rise slowly from a lying to sitting and then a standing position. Climb stairs carefully. Use caution when carrying your baby up and down the stairs. NO SEXUAL Activity for 6 weeks or until advised by your doctor; Nothing in vagina: intercourse, tampons, or douching. Be prepared to discuss family planning at your follow-up OB visit. You may feel tired or have a lack of energy. You may continue your prenatal vitamin to replenish nutrients post delivery. Nap when baby naps to catch up on sleep. May shower, NO tub baths, swimming, or hot tubs. EMOTIONS  You may feel gar, sad, teary, & overwhelmed for the first 2 weeks postpartum. Contact your OB provider if you feel you may be showing signs of postpartum depression, or have thoughts of harming yourself or your infant. If infant will not stop crying, contact another adult for help or place infant in their crib on their back and take a break.   NEVER shake your

## 2023-09-27 LAB — SURGICAL PATHOLOGY REPORT: NORMAL

## 2023-10-03 PROBLEM — Z3A.40 40 WEEKS GESTATION OF PREGNANCY: Status: RESOLVED | Noted: 2023-09-21 | Resolved: 2023-10-03

## 2023-10-03 NOTE — PROGRESS NOTES
Postpartum Visit    Tati Funez is a 25 y.o. female , 2 weeks Post Partum s/p   on 23    The patient was seen. She has no chief complaint today. Her pregnancy was complicated by GBS and Rh negative status. She is  breast feeding/pumping and denies signs or symptoms of mastitis. States she has already built up a good freezer stash. She had a left sulcal and 1st degree laceration. Currently having no pelvic pain or abnormal discharge. The patient completed the E.P.D.S. Post Partum Depression Evaluation form and EPDS Score of 0. She does not have signs or symptoms of post partum depression. She does admit to having good home support. Reports her partner was able to stay home these last 2 weeks and her mom lives only a few houses down the street. Today her lochia is light she denies any dizziness or shortness of breath. Her bowels are regular and she denies any urinary tract symptomology. She is currently not sexually active and does not want to start any type of birth control at this time.      Vitals:   Vitals:    10/04/23 1134   BP: 112/71   Site: Right Upper Arm   Position: Sitting   Cuff Size: Medium Adult   Pulse: (!) 107   Weight: 149 lb (67.6 kg)   Height: 5' 9.75\" (1.772 m)         Physical Exam:     General:  no apparent distress, alert, and cooperative  Lungs:  No increased work of breathing   Abdomen: Abdomen soft, non-tender, no rebound, guarding, rigidity    Fundus: non-tender, normal size, firm, below umbilicus   Perineum: not inspected   Extremities:  no calf tenderness, non edematous, non erythematous bilateral lower extremities     Assessment:  Tati Funez is a 25 y.o. female , 2 weeks Post Partum s/p   on 23   - VSS   - Overall patient is doing very well    - EPDS: 0   - Family planning: not sexually active    - Rhogam given prior to discharge from hospital    - Influenza vaccination: due this season    - COVID-19 vaccination: R/B/A discussed with increased risk

## 2023-10-04 ENCOUNTER — POSTPARTUM VISIT (OUTPATIENT)
Dept: OBGYN CLINIC | Age: 28
End: 2023-10-04

## 2023-10-04 VITALS
WEIGHT: 149 LBS | HEIGHT: 70 IN | DIASTOLIC BLOOD PRESSURE: 71 MMHG | HEART RATE: 107 BPM | BODY MASS INDEX: 21.33 KG/M2 | SYSTOLIC BLOOD PRESSURE: 112 MMHG

## 2023-10-04 PROCEDURE — 0503F POSTPARTUM CARE VISIT: CPT | Performed by: STUDENT IN AN ORGANIZED HEALTH CARE EDUCATION/TRAINING PROGRAM

## 2023-11-02 ENCOUNTER — POSTPARTUM VISIT (OUTPATIENT)
Dept: OBGYN CLINIC | Age: 28
End: 2023-11-02

## 2023-11-02 VITALS
BODY MASS INDEX: 21.21 KG/M2 | HEART RATE: 82 BPM | WEIGHT: 146.8 LBS | DIASTOLIC BLOOD PRESSURE: 81 MMHG | SYSTOLIC BLOOD PRESSURE: 119 MMHG

## 2023-11-02 DIAGNOSIS — N94.6 DYSMENORRHEA: ICD-10-CM

## 2023-11-02 PROBLEM — O26.899 RH NEGATIVE STATE IN ANTEPARTUM PERIOD: Status: RESOLVED | Noted: 2023-03-06 | Resolved: 2023-11-02

## 2023-11-02 PROBLEM — O99.820 GBS (GROUP B STREPTOCOCCUS CARRIER), +RV CULTURE, CURRENTLY PREGNANT: Status: RESOLVED | Noted: 2023-08-30 | Resolved: 2023-11-02

## 2023-11-02 PROBLEM — Z67.91 RH NEGATIVE STATE IN ANTEPARTUM PERIOD: Status: RESOLVED | Noted: 2023-03-06 | Resolved: 2023-11-02

## 2023-11-02 RX ORDER — ACETAMINOPHEN AND CODEINE PHOSPHATE 120; 12 MG/5ML; MG/5ML
1 SOLUTION ORAL DAILY
Qty: 90 TABLET | Refills: 3 | Status: SHIPPED | OUTPATIENT
Start: 2023-11-02

## 2023-11-02 NOTE — PROGRESS NOTES
with postpartum restrictions  Can resume heavy lifting and [address] [facility], DO [facility]   11/2/2023, 1:35 PM

## 2024-02-01 ENCOUNTER — TELEPHONE (OUTPATIENT)
Dept: OBGYN CLINIC | Age: 29
End: 2024-02-01

## 2024-02-01 NOTE — TELEPHONE ENCOUNTER
Pt stated she received a letter from our office for an annual. Pt would like it noted that she only came to us for her pregnancy and goes elsewhere for annual workup.     Are we able to edward or identify that on a pt chart? Pt would just like to ensure she is not charged or receives any more letters.    Advised pt that our office would not charge anything for not scheduling. That the only time a charge is made is if she was seen in office for an appt.